# Patient Record
Sex: FEMALE | Race: BLACK OR AFRICAN AMERICAN | NOT HISPANIC OR LATINO | Employment: UNEMPLOYED | URBAN - METROPOLITAN AREA
[De-identification: names, ages, dates, MRNs, and addresses within clinical notes are randomized per-mention and may not be internally consistent; named-entity substitution may affect disease eponyms.]

---

## 2019-04-01 ENCOUNTER — HOSPITAL ENCOUNTER (EMERGENCY)
Facility: CLINIC | Age: 29
Discharge: HOME | End: 2019-04-02
Attending: EMERGENCY MEDICINE | Admitting: EMERGENCY MEDICINE

## 2019-04-01 VITALS
WEIGHT: 166.6 LBS | SYSTOLIC BLOOD PRESSURE: 143 MMHG | OXYGEN SATURATION: 98 % | DIASTOLIC BLOOD PRESSURE: 92 MMHG | HEIGHT: 67 IN | TEMPERATURE: 97.9 F | BODY MASS INDEX: 26.15 KG/M2 | RESPIRATION RATE: 18 BRPM

## 2019-04-01 DIAGNOSIS — H66.90 ACUTE OTITIS MEDIA, UNSPECIFIED OTITIS MEDIA TYPE: ICD-10-CM

## 2019-04-01 PROCEDURE — 99283 EMERGENCY DEPT VISIT LOW MDM: CPT | Performed by: EMERGENCY MEDICINE

## 2019-04-01 PROCEDURE — 99283 EMERGENCY DEPT VISIT LOW MDM: CPT | Mod: Z6 | Performed by: EMERGENCY MEDICINE

## 2019-04-01 RX ORDER — AMOXICILLIN 500 MG/1
500 CAPSULE ORAL 3 TIMES DAILY
Qty: 21 CAPSULE | Refills: 0 | Status: SHIPPED | OUTPATIENT
Start: 2019-04-01 | End: 2019-04-08

## 2019-04-01 RX ORDER — IBUPROFEN 200 MG
200 TABLET ORAL EVERY 4 HOURS PRN
COMMUNITY
End: 2024-01-02

## 2019-04-01 ASSESSMENT — MIFFLIN-ST. JEOR: SCORE: 1523.32

## 2019-04-01 ASSESSMENT — ENCOUNTER SYMPTOMS: FEVER: 0

## 2019-04-01 NOTE — ED AVS SNAPSHOT
[facility], [place], Emergency Department  [address] [address]  Phone:  [phone]                                    Maryann Mc   MRN: [id number]    Department:  [facility], [place], Emergency Department   Date of Visit:  4/1/2019           After Visit Summary Signature Page    I have received my discharge instructions, and my questions have been answered. I have discussed any challenges I see with this plan with the nurse or doctor.    ..........................................................................................................................................  Patient/Patient Representative Signature      ..........................................................................................................................................  Patient Representative Print Name and Relationship to Patient    ..................................................               ................................................  Date                                   Time    ..........................................................................................................................................  Reviewed by Signature/Title    ...................................................              ..............................................  Date                                               Time          22EPIC Rev 08/18

## 2019-04-02 NOTE — ED PROVIDER NOTES
"  History     Chief Complaint   Patient presents with     Ear Drainage     Otalgia     HPI  Maryann Mc is a 28 year old female who presents to the Emergency Department today for evaluation of right ear pain, fullness, and drainage for he past 2 days. The patient states that her pain has now worsened making it difficult to sleep. She states that pain is now radiating into the right side of her face. She denies any changes in her hearing. No fevers.     I have reviewed the Medications, Allergies, Past Medical and Surgical History, and Social History in the Epic system.    History reviewed. No pertinent past medical history.    Past Surgical History:   Procedure Laterality Date     GYN SURGERY       ORTHOPEDIC SURGERY       No family history on file.    Social History     Tobacco Use     Smoking status: Current Every Day Smoker     Smokeless tobacco: Never Used   Substance Use Topics     Alcohol use: Yes     Alcohol/week: 1.2 oz     Types: 2 Glasses of wine per week     No current facility-administered medications for this encounter.      Current Outpatient Medications   Medication     amoxicillin (AMOXIL) 500 MG capsule     ibuprofen (ADVIL/MOTRIN) 200 MG tablet      No Known Allergies     Review of Systems   Constitutional: Negative for fever.   HENT: Positive for ear discharge (right) and ear pain (right).    All other systems reviewed and are negative.    Physical Exam   BP: (!) 143/92  Heart Rate: 91  Temp: 97.9  F (36.6  C)  Resp: 18  Height: 170.2 cm (5' 7\")  Weight: 75.6 kg (166 lb 9.6 oz)  SpO2: 98 %    Physical Exam   Constitutional: No distress.   HENT:   Head: Atraumatic.   Mouth/Throat: Oropharynx is clear and moist. No oropharyngeal exudate.   Right TM erythematous on visualized section, wax obscures most of the eardrum   Eyes: EOM are normal. No scleral icterus.   Neck: Neck supple.   Cardiovascular: Normal rate, regular rhythm and normal heart sounds.   Pulmonary/Chest: Breath sounds normal. No " respiratory distress.   Abdominal: Soft. She exhibits no distension. There is no tenderness.   Musculoskeletal: She exhibits no edema or deformity.   Neurological: She is alert.   Skin: Skin is warm and dry. She is not diaphoretic.   Psychiatric: She has a normal mood and affect. Her behavior is normal.       ED Course   9:48 PM  The patient was seen and examined by Dr. Sylvester in Room 12.       Procedures           Labs Ordered and Resulted from Time of ED Arrival Up to the Time of Departure from the ED - No data to display    No results found for this or any previous visit (from the past 24 hour(s)).        Assessments & Plan (with Medical Decision Making)   28-year-old female presents to us with a chief complaint of ear pain.  Differential includes but not limited to otitis media, otitis externa.  No evidence of X Pretty on exam.  Part of her eardrum is visible and is erythematous.  Large amount of wax in the ear canals obscuring it otherwise we will treat her for otitis media as I cannot rule it out based on the exam.    I have reviewed the nursing notes.    I have reviewed the findings, diagnosis, plan and need for follow up with the patient.     Medication List      Started    amoxicillin 500 MG capsule  Commonly known as:  AMOXIL  500 mg, Oral, 3 TIMES DAILY          Final diagnoses:   Acute otitis media, unspecified otitis media type   I, Edson Swan, am serving as a trained medical scribe to document services personally performed by Giovani Sylvester DO, based on the provider's statements to me.   I, Giovani Sylvester DO, was physically present and have reviewed and verified the accuracy of this note documented by Edson Swan.    4/1/2019   [facility], EMERGENCY DEPARTMENT     Giovani Sylvester DO  04/01/19 2215

## 2019-04-02 NOTE — ED TRIAGE NOTES
Pt c/o R ear ache and drainage. States that her face and tooth is now sore. Pt reports taking 5 200mg tablets of ibuprofen. Pt educated on med administration.

## 2019-04-02 NOTE — ED NOTES
Called patient and informed her that we have her antibiotic prescription that she did not receive d/t her leaving before us discharging her with paperwor.  She stated she will be back to pick it up.

## 2019-04-02 NOTE — ED NOTES
Pt left d/t concerns of her car getting a ticket. I informed her I was printing her paperwork and it would only be a couple more minutes. Pt still insisted on leaving and said she will be back for her paperwork.

## 2024-01-02 ENCOUNTER — HOSPITAL ENCOUNTER (EMERGENCY)
Facility: CLINIC | Age: 34
Discharge: HOME | End: 2024-01-02
Admitting: PHYSICIAN ASSISTANT
Payer: COMMERCIAL

## 2024-01-02 ENCOUNTER — HOSPITAL ENCOUNTER (EMERGENCY)
Facility: CLINIC | Age: 34
End: 2024-01-02

## 2024-01-02 VITALS
HEART RATE: 91 BPM | SYSTOLIC BLOOD PRESSURE: 151 MMHG | TEMPERATURE: 98 F | RESPIRATION RATE: 19 BRPM | DIASTOLIC BLOOD PRESSURE: 95 MMHG | OXYGEN SATURATION: 97 %

## 2024-01-02 DIAGNOSIS — B34.9 VIRAL SYNDROME: ICD-10-CM

## 2024-01-02 LAB
FLUAV RNA SPEC QL NAA+PROBE: NEGATIVE
FLUBV RNA RESP QL NAA+PROBE: NEGATIVE
GROUP A STREP BY PCR: NOT DETECTED
RSV RNA SPEC NAA+PROBE: NEGATIVE
SARS-COV-2 RNA RESP QL NAA+PROBE: NEGATIVE

## 2024-01-02 PROCEDURE — 87637 SARSCOV2&INF A&B&RSV AMP PRB: CPT | Performed by: EMERGENCY MEDICINE

## 2024-01-02 PROCEDURE — 99283 EMERGENCY DEPT VISIT LOW MDM: CPT | Performed by: PHYSICIAN ASSISTANT

## 2024-01-02 PROCEDURE — 87651 STREP A DNA AMP PROBE: CPT | Performed by: EMERGENCY MEDICINE

## 2024-01-02 ASSESSMENT — ENCOUNTER SYMPTOMS: FLU SYMPTOMS: 1

## 2024-01-02 ASSESSMENT — ACTIVITIES OF DAILY LIVING (ADL): ADLS_ACUITY_SCORE: 33

## 2024-01-02 NOTE — ED TRIAGE NOTES
Triage Assessment (Adult)       Row Name 01/02/24 1547          Triage Assessment    Airway WDL WDL        Respiratory WDL    Respiratory WDL WDL        Skin Circulation/Temperature WDL    Skin Circulation/Temperature WDL WDL        Cardiac WDL    Cardiac WDL WDL        Peripheral/Neurovascular WDL    Peripheral Neurovascular WDL WDL        Cognitive/Neuro/Behavioral WDL    Cognitive/Neuro/Behavioral WDL WDL

## 2024-01-02 NOTE — ED PROVIDER NOTES
ED Provider Note  [facility]      History     Chief Complaint   Patient presents with    Flu Symptoms     Pt c/o throat pain, cough with green mucus and headache started last Friday. Pt taking tylenol but she states it is not working.          Flu Symptoms    34yo F no pmhx p/w viral illness symptoms x5 days.  Reports sore throat, subjective fever and chills, congestion, rhinorrhea, headache, productive cough.  Denies CP, SOB, abdominal pain, nausea, vomiting, diarrhea.  States that she was around her brother who recently tested COVID-positive.    Past Medical History  Past Medical History:   Diagnosis Date    Bronchitis      Past Surgical History:   Procedure Laterality Date    GYN SURGERY      ORTHOPEDIC SURGERY       No current outpatient medications on file.    No Known Allergies  Family History  History reviewed. No pertinent family history.  Social History   Social History     Tobacco Use    Smoking status: Every Day    Smokeless tobacco: Never   Substance Use Topics    Alcohol use: Yes     Alcohol/week: 2.0 standard drinks of alcohol     Types: 2 Glasses of wine per week    Drug use: No         A medically appropriate review of systems was performed with pertinent positives and negatives noted in the HPI, and all other systems negative.    Physical Exam   BP: (!) 151/95  Pulse: 91  Temp: 98  F (36.7  C)  Resp: 19  SpO2: 97 %  Physical Exam  Constitutional:       General: She is not in acute distress.     Appearance: Normal appearance. She is well-developed.   HENT:      Head: Normocephalic and atraumatic.   Eyes:      Conjunctiva/sclera: Conjunctivae normal.   Cardiovascular:      Rate and Rhythm: Normal rate and regular rhythm.   Pulmonary:      Effort: Pulmonary effort is normal.      Breath sounds: Normal breath sounds.   Musculoskeletal:      Cervical back: Normal range of motion and neck supple.   Skin:     General: Skin is warm and dry.      Findings: No rash.   Neurological:       Mental Status: She is alert and oriented to person, place, and time.           ED Course, Procedures, & Data      Procedures                      Results for orders placed or performed during the hospital encounter of 01/02/24   Symptomatic Influenza A/B, RSV, & SARS-CoV2 PCR (COVID-19) Nasopharyngeal     Status: Normal    Specimen: Nasopharyngeal; Swab   Result Value Ref Range    Influenza A PCR Negative Negative    Influenza B PCR Negative Negative    RSV PCR Negative Negative    SARS CoV2 PCR Negative Negative    Narrative    Testing was performed using the Xpert Xpress CoV2/Flu/RSV Assay on the [employer] Instrument. This test should be ordered for the detection of SARS-CoV-2, influenza, and RSV viruses in individuals who meet clinical and/or epidemiological criteria. Test performance is unknown in asymptomatic patients. This test is for in vitro diagnostic use under the FDA EUA for laboratories certified under CLIA to perform high or moderate complexity testing. This test has not been FDA cleared or approved. A negative result does not rule out the presence of PCR inhibitors in the specimen or target RNA in concentration below the limit of detection for the assay. If only one viral target is positive but coinfection with multiple targets is suspected, the sample should be re-tested with another FDA cleared, approved, or authorized test, if coinfection would change clinical management. This test was validated by the [facility] and [facility]. These laboratories are certified under the Clinical Laboratory Improvement Amendments of 1988 (CLIA-88) as qualified to perform high complexity laboratory testing.   Group A Streptococcus PCR Throat Swab     Status: Normal    Specimen: Throat; Swab   Result Value Ref Range    Group A strep by PCR Not Detected Not Detected    Narrative    The Xpert Xpress Strep A test, performed on the [employer], is a rapid, qualitative in vitro  diagnostic test for the detection of Streptococcus pyogenes (Group A ß-hemolytic Streptococcus, Strep A) in throat swab specimens from patients with signs and symptoms of pharyngitis. The Xpert Xpress Strep A test can be used as an aid in the diagnosis of Group A Streptococcal pharyngitis. The assay is not intended to monitor treatment for Group A Streptococcus infections. The Xpert Xpress Strep A test utilizes an automated real-time polymerase chain reaction (PCR) to detect Streptococcus pyogenes DNA.     Medications - No data to display  Labs Ordered and Resulted from Time of ED Arrival to Time of ED Departure   INFLUENZA A/B, RSV, & SARS-COV2 PCR - Normal       Result Value    Influenza A PCR Negative      Influenza B PCR Negative      RSV PCR Negative      SARS CoV2 PCR Negative     GROUP A STREPTOCOCCUS PCR THROAT SWAB - Normal    Group A strep by PCR Not Detected       No orders to display          Critical care was not performed.     Medical Decision Making  The patient's presentation was of moderate complexity (an acute illness with systemic symptoms).    The patient's evaluation involved:  ordering and/or review of 3+ test(s) in this encounter (see separate area of note for details)    The patient's management necessitated only low risk treatment.    Assessment & Plan    34yo F no pmhx p/w viral illness symptoms x5 days I/s/o known COVID exposure.  In ED patient is HDS/AF, normal SpO2.  Well-appearing.  Lungs clear.  COVID testnegative.  Influenza, RSV, strep negative.  Patient afebrile with normal sats in the ED, low suspicion PNA.  Symptoms consistent with viral syndrome.  Patient discharged with symptomatic care instructions, PCP follow-up, ER return precautions.    I have reviewed the nursing notes. I have reviewed the findings, diagnosis, plan and need for follow up with the patient.    New Prescriptions    No medications on file       Final diagnoses:   Viral syndrome         GARRETT Hoskins  [facility] EMERGENCY DEPARTMENT  1/2/2024     Juvencio Campbell PA-C  01/02/24 1736

## 2024-01-02 NOTE — ED TRIAGE NOTES
Pt c/o throat pain, cough with green mucus and headache started last Friday. Pt taking tylenol but she states it is not working.

## 2024-01-02 NOTE — Clinical Note
Maryann Mc was seen and treated in our emergency department on 1/2/2024.         Sincerely,     [facility] Emergency Department

## 2024-04-15 ENCOUNTER — HOSPITAL ENCOUNTER (EMERGENCY)
Facility: CLINIC | Age: 34
Discharge: HOME | End: 2024-04-16
Attending: EMERGENCY MEDICINE | Admitting: EMERGENCY MEDICINE
Payer: COMMERCIAL

## 2024-04-15 DIAGNOSIS — R09.81 NASAL CONGESTION: ICD-10-CM

## 2024-04-15 PROCEDURE — 99283 EMERGENCY DEPT VISIT LOW MDM: CPT | Performed by: EMERGENCY MEDICINE

## 2024-04-15 PROCEDURE — 99284 EMERGENCY DEPT VISIT MOD MDM: CPT | Performed by: EMERGENCY MEDICINE

## 2024-04-15 ASSESSMENT — COLUMBIA-SUICIDE SEVERITY RATING SCALE - C-SSRS
6. HAVE YOU EVER DONE ANYTHING, STARTED TO DO ANYTHING, OR PREPARED TO DO ANYTHING TO END YOUR LIFE?: NO
2. HAVE YOU ACTUALLY HAD ANY THOUGHTS OF KILLING YOURSELF IN THE PAST MONTH?: NO
1. IN THE PAST MONTH, HAVE YOU WISHED YOU WERE DEAD OR WISHED YOU COULD GO TO SLEEP AND NOT WAKE UP?: NO

## 2024-04-16 VITALS
HEART RATE: 75 BPM | RESPIRATION RATE: 16 BRPM | OXYGEN SATURATION: 99 % | SYSTOLIC BLOOD PRESSURE: 129 MMHG | DIASTOLIC BLOOD PRESSURE: 77 MMHG | TEMPERATURE: 97.7 F

## 2024-04-16 PROCEDURE — 250N000009 HC RX 250: Performed by: EMERGENCY MEDICINE

## 2024-04-16 PROCEDURE — 250N000013 HC RX MED GY IP 250 OP 250 PS 637: Performed by: EMERGENCY MEDICINE

## 2024-04-16 RX ORDER — OXYMETAZOLINE HYDROCHLORIDE 0.05 G/100ML
2 SPRAY NASAL ONCE
Status: COMPLETED | OUTPATIENT
Start: 2024-04-16 | End: 2024-04-16

## 2024-04-16 RX ORDER — TRIAMCINOLONE ACETONIDE 0.25 MG/G
OINTMENT TOPICAL 2 TIMES DAILY
Qty: 15 G | Refills: 0 | Status: SHIPPED | OUTPATIENT
Start: 2024-04-16

## 2024-04-16 RX ORDER — TRIAMCINOLONE ACETONIDE 1 MG/G
CREAM TOPICAL
Qty: 15 G | Refills: 0 | Status: SHIPPED | OUTPATIENT
Start: 2024-04-16 | End: 2024-04-16 | Stop reason: ALTCHOICE

## 2024-04-16 RX ORDER — FLUTICASONE PROPIONATE 50 MCG
1 SPRAY, SUSPENSION (ML) NASAL DAILY
Status: DISCONTINUED | OUTPATIENT
Start: 2024-04-16 | End: 2024-04-16 | Stop reason: HOSPADM

## 2024-04-16 RX ADMIN — FLUTICASONE PROPIONATE 1 SPRAY: 50 SPRAY, METERED NASAL at 02:07

## 2024-04-16 RX ADMIN — OXYMETAZOLINE HYDROCHLORIDE 2 SPRAY: 0.05 SPRAY NASAL at 01:58

## 2024-04-16 ASSESSMENT — ACTIVITIES OF DAILY LIVING (ADL)
ADLS_ACUITY_SCORE: 35
ADLS_ACUITY_SCORE: 33

## 2024-04-16 NOTE — ED PROVIDER NOTES
[facility] EMERGENCY DEPARTMENT ([facility])    4/15/24      ED PROVIDER NOTE    History     Chief Complaint   Patient presents with    Cold Symptoms     Pt states she thinks she is having an allergy flare-ups, sneezing a lot and congested for about two days. Has taken claritin and tylenol with no relief. Also endorses having eye puffiness and headache earlier.    Rash     Also stating she is having a breakout on her face, states she thinks it is her eczema but has had no relief with creams at home.      HPI  Maryann Mc is a 33 year old female with history of eczema who presents to the ED with cold symptoms and rash. She reports frequent sneezing and congestion for the past day. She is concerned of allergy flare-up. She took Claritin today without improvement. She also reports a rash on her face that she believes is eczema. She states her brother has triamcinolone cream for his eczema that has helped her in the past. She also reports headache earlier in the day that resolved with Tylenol.    Past Medical History  Past Medical History:   Diagnosis Date    Bronchitis      Past Surgical History:   Procedure Laterality Date    GYN SURGERY      ORTHOPEDIC SURGERY       triamcinolone (KENALOG) 0.025 % external ointment      No Known Allergies  Family History  No family history on file.  Social History   Social History     Tobacco Use    Smoking status: Every Day    Smokeless tobacco: Never   Substance Use Topics    Alcohol use: Yes     Alcohol/week: 2.0 standard drinks of alcohol     Types: 2 Glasses of wine per week    Drug use: No      Past medical history, past surgical history, medications, allergies, family history, and social history were reviewed with the patient. No additional pertinent items.      A complete review of systems was performed with pertinent positives and negatives noted in the HPI, and all other systems negative.    Physical Exam   BP: (!) 133/92  Pulse: 93  Temp: 98.9  F (37.2  C)  Resp:  16  SpO2: 97 %  Physical Exam  General: Afebrile, no acute distress   HEENT: Normocephalic, atraumatic, conjunctivae normal. + nasal congestion, MMM  Neck: non-tender, supple  Cardio: regular rate. regular rhythm   Resp: Normal work of breathing, no respiratory distress, lungs clear bilaterally, no wheezing, rhonchi, rales  Chest/Back: no visual signs of trauma, no CVA tenderness   Abdomen: soft, non distension, no tenderness, no peritoneal signs   Neuro: alert and fully oriented. CN II-XII grossly intact. Grossly normal strength and sensation in all extremities.   MSK: no deformities. Normal range of motion  Integumentary/Skin: no rash visualized, normal color  Psych: normal affect, normal behavior    ED Course, Procedures, & Data      Procedures    No results found for any visits on 04/15/24.  Medications   fluticasone (FLONASE) 50 MCG/ACT spray 1 spray (has no administration in time range)   oxymetazoline (AFRIN) 0.05 % spray 2 spray (2 sprays Both Nostrils $Given 4/16/24 0158)     Labs Ordered and Resulted from Time of ED Arrival to Time of ED Departure - No data to display  No orders to display          Critical care was not performed.     Medical Decision Making  The patient's presentation was of low complexity (2+ clearly self-limited or minor problems).    The patient's evaluation involved:  history and exam without other MDM data elements    The patient's management necessitated moderate risk (prescription drug management including medications given in the ED).    Assessment & Plan    Maryann Mc is a 33 year old female with history of eczema who presents to the ED with cold symptoms, nasal congestion and sneezing x 1 day as well as flareup of her eczema.  On arrival patient is nontoxic-appearing, afebrile, no distress.  Patient hemodynamically stable vital signs within normal limits.  Patient with nasal congestion on examination.  Patient was treated with Afrin in the emergency department.  Will plan on  discharge home with Flonase, over-the-counter decongestions, triamcinolone ointment as this has worked for patient's eczema symptoms in the past as well as continue supportive care and close outpatient follow-up.  Return precautions discussed.  Patient understands and agrees with the plan.    I have reviewed the nursing notes. I have reviewed the findings, diagnosis, plan and need for follow up with the patient.    New Prescriptions    TRIAMCINOLONE (KENALOG) 0.025 % EXTERNAL OINTMENT    Apply topically 2 times daily       Final diagnoses:   Nasal congestion       Shannon Pettit MD  [facility] EMERGENCY DEPARTMENT  4/15/2024     Shannon Pettit MD  04/16/24 0200

## 2024-04-16 NOTE — DISCHARGE INSTRUCTIONS
Please follow-up with your primary care provider in the next 2 to 3 days if no improvement of your symptoms.  Please call to schedule an appointment.    Please continue an over-the-counter allergy medication daily (every morning).  Please also use the nasal spray 1 to 2 sprays in both nostrils daily for the next 7 to 10 days.    Please take Tylenol and ibuprofen as needed for fever, pain.  You may also use an over-the-counter decongestion to help with your symptoms (ex: sudafed, mucinex, etc).    Please Return to the emergency department if you develop high fever, difficulty breathing, new or worsening symptoms.  It was a pleasure taking care of you today.  We hope you feel better soon.

## 2024-04-16 NOTE — ED TRIAGE NOTES
Triage Assessment (Adult)       Row Name 04/15/24 4893          Triage Assessment    Airway WDL WDL        Respiratory WDL    Respiratory WDL WDL        Skin Circulation/Temperature WDL    Skin Circulation/Temperature WDL WDL        Cardiac WDL    Cardiac WDL WDL        Peripheral/Neurovascular WDL    Peripheral Neurovascular WDL WDL        Cognitive/Neuro/Behavioral WDL    Cognitive/Neuro/Behavioral WDL WDL

## 2025-07-29 ENCOUNTER — HOSPITAL ENCOUNTER (EMERGENCY)
Facility: CLINIC | Age: 35
Discharge: HOME | End: 2025-07-29
Payer: COMMERCIAL

## 2025-07-29 VITALS
DIASTOLIC BLOOD PRESSURE: 89 MMHG | OXYGEN SATURATION: 100 % | HEIGHT: 67 IN | HEART RATE: 96 BPM | WEIGHT: 170 LBS | BODY MASS INDEX: 26.68 KG/M2 | TEMPERATURE: 98.5 F | RESPIRATION RATE: 16 BRPM | SYSTOLIC BLOOD PRESSURE: 159 MMHG

## 2025-07-29 DIAGNOSIS — S61.211A LACERATION OF LEFT INDEX FINGER WITHOUT FOREIGN BODY WITHOUT DAMAGE TO NAIL, INITIAL ENCOUNTER: Primary | ICD-10-CM

## 2025-07-29 PROCEDURE — 99284 EMERGENCY DEPT VISIT MOD MDM: CPT | Performed by: STUDENT IN AN ORGANIZED HEALTH CARE EDUCATION/TRAINING PROGRAM

## 2025-07-29 PROCEDURE — 99283 EMERGENCY DEPT VISIT LOW MDM: CPT

## 2025-07-29 PROCEDURE — 250N000013 HC RX MED GY IP 250 OP 250 PS 637: Performed by: STUDENT IN AN ORGANIZED HEALTH CARE EDUCATION/TRAINING PROGRAM

## 2025-07-29 RX ORDER — ACETAMINOPHEN 500 MG
1000 TABLET ORAL ONCE
Status: COMPLETED | OUTPATIENT
Start: 2025-07-29 | End: 2025-07-29

## 2025-07-29 RX ORDER — CEPHALEXIN 500 MG/1
500 CAPSULE ORAL 2 TIMES DAILY
Qty: 10 CAPSULE | Refills: 0 | Status: SHIPPED | OUTPATIENT
Start: 2025-07-29 | End: 2025-08-03

## 2025-07-29 RX ORDER — CEPHALEXIN 500 MG/1
500 CAPSULE ORAL ONCE
Status: COMPLETED | OUTPATIENT
Start: 2025-07-29 | End: 2025-07-29

## 2025-07-29 RX ADMIN — ACETAMINOPHEN 1000 MG: 500 TABLET ORAL at 22:15

## 2025-07-29 RX ADMIN — CEPHALEXIN 500 MG: 500 CAPSULE ORAL at 22:12

## 2025-07-29 ASSESSMENT — COLUMBIA-SUICIDE SEVERITY RATING SCALE - C-SSRS
1. IN THE PAST MONTH, HAVE YOU WISHED YOU WERE DEAD OR WISHED YOU COULD GO TO SLEEP AND NOT WAKE UP?: NO
2. HAVE YOU ACTUALLY HAD ANY THOUGHTS OF KILLING YOURSELF IN THE PAST MONTH?: NO
6. HAVE YOU EVER DONE ANYTHING, STARTED TO DO ANYTHING, OR PREPARED TO DO ANYTHING TO END YOUR LIFE?: NO

## 2025-07-29 ASSESSMENT — ACTIVITIES OF DAILY LIVING (ADL): ADLS_ACUITY_SCORE: 41

## 2025-07-30 NOTE — ED NOTES
Pts lac dressed prior to discharge. Pt given discharge paperwork and printed prescription, no questions for writer.

## 2025-07-30 NOTE — DISCHARGE INSTRUCTIONS
You were seen in the emergency room today for evaluation of a cut to your left index finger.  The area was cleaned, covered with an antibiotic ointment, and wrapped.  I gave you a dose of an oral antibiotic to help prevent any infection.  I sent a prescription for the antibiotic to continue taking twice a day for the next 5 days.  If you have any new or worsening symptoms, please return to the emergency department, otherwise follow-up with your primary care provider.

## 2025-07-30 NOTE — ED PROVIDER NOTES
ED Provider Note  [facility]      History     Chief Complaint   Patient presents with    Hand Pain     Pt reports was cutting onion and accidentally  cut left index finger last night around 2200. No bleeding. Just wants to get it check.      The history is provided by the patient and medical records. No  was used.     Maryann Mc is an otherwise healthy, right-hand-dominant 34 year old female who presents to the emergency department for evaluation of a finger laceration. She reports about 24 hours ago, she was using a kitchen knife to cut an onion when she accidentally cut the palmar aspect of her left index finger. She rinsed the area immediately after the injury and bleeding was well-controlled. She presents to the ED today for evaluation, just wants to ensure there is nothing wrong. She is able to move the finger although it is uncomfortable and she has noticed some redness and swelling around the laceration. She denies any fevers, chills, nausea, vomiting, or other concerning symptoms. Per [facility], last tetanus was administered 6/12/2025.    Past Medical History  Past Medical History:   Diagnosis Date    Bronchitis      Past Surgical History:   Procedure Laterality Date    GYN SURGERY      ORTHOPEDIC SURGERY       cephALEXin (KEFLEX) 500 MG capsule  triamcinolone (KENALOG) 0.025 % external ointment      No Known Allergies  Family History  No family history on file.  Social History   Social History     Tobacco Use    Smoking status: Every Day    Smokeless tobacco: Never   Substance Use Topics    Alcohol use: Yes     Alcohol/week: 2.0 standard drinks of alcohol     Types: 2 Glasses of wine per week    Drug use: No      A medically appropriate review of systems was performed with pertinent positives and negatives noted in the HPI, and all other systems negative.    Physical Exam   BP: (!) 159/89  Pulse: 96  Temp: 98.5  F (36.9  C)  Resp: 16  Height: 170.2 cm (5'  "7\")  Weight: 77.1 kg (170 lb)  SpO2: 100 %  Physical Exam  Constitutional:       General: She is not in acute distress.     Appearance: Normal appearance. She is not ill-appearing, toxic-appearing or diaphoretic.   HENT:      Head: Atraumatic.      Mouth/Throat:      Mouth: Mucous membranes are moist.   Eyes:      General: No scleral icterus.     Conjunctiva/sclera: Conjunctivae normal.   Cardiovascular:      Rate and Rhythm: Normal rate.      Heart sounds: Normal heart sounds.   Pulmonary:      Effort: Pulmonary effort is normal. No respiratory distress.   Musculoskeletal:      Cervical back: Neck supple.   Skin:     General: Skin is warm.      Capillary Refill: Capillary refill takes less than 2 seconds.      Findings: No rash.      Comments: 1 cm superficial laceration to palmar aspect of PIP joint of left index finger, bleeding controlled, able to flex and extend finger against resistance at PIP and DIP joints, mild discomfort at site of laceration with passive extension, some surrounding erythema and edema but no fusiform swelling, no tenderness along palpation of flexor tendon, distal sensation and cap refill intact   Neurological:      General: No focal deficit present.      Mental Status: She is alert and oriented to person, place, and time.      Sensory: No sensory deficit.      Motor: No weakness.      Gait: Gait normal.           ED Course, Procedures, & Data      Procedures             Medications   cephALEXin (KEFLEX) capsule 500 mg (500 mg Oral $Given 7/29/25 2212)   acetaminophen (TYLENOL) tablet 1,000 mg (1,000 mg Oral $Given 7/29/25 2215)          Critical care was not performed.     Medical Decision Making  The patient's presentation was of moderate complexity (an undiagnosed new problem with uncertain prognosis).    The patient's evaluation involved:  review of external note(s) from 1 sources ([facility])    The patient's management necessitated moderate risk (prescription drug management including " medications given in the ED) and moderate risk (a decision regarding minor procedure (laceration repair) with risk factors of delayed closure).    Assessment & Plan    Maryann Mc is an otherwise healthy, right-hand-dominant 34 year old female who presents to the emergency department for evaluation of a finger laceration. About 24 hours prior to presentation she was using a clean kitchen knife to cut an onion when she actually cut the palmar aspect of the PIP joint on her left index finger, presenting today with some swelling, redness and pain at the laceration site. On evaluation, she is hypertensive to 159/89, vitals are otherwise in normal ranges. On full assessment of the finger, considered possibility for flexor tenosynovitis, although low suspicion of this at present given no tenderness over flexor tendon sheath, ability to flex and extend with only mild discomfort at the laceration site, finger not held in flexion, only localized edema. Exam today is otherwise benign. Discussed options for wound management with patient, do not recommend closing wound at this time given that it has been 24 hours since initial injury which increases risk of infection. Will provide with antibiotic prophylaxis due to patient's significant concern. Advised to keep the area clean and dry while it heals, follow-up with primary care as needed, ED return precautions discussed. Patient and partner were in understanding and agreement with plan and had no additional questions at this time. She was discharged home in stable condition.    I have reviewed the nursing notes. I have reviewed the findings, diagnosis, plan and need for follow up with the patient.    Discharge Medication List as of 7/29/2025 10:15 PM        START taking these medications    Details   cephALEXin (KEFLEX) 500 MG capsule Take 1 capsule (500 mg) by mouth 2 times daily for 5 days., Disp-10 capsule, R-0, Local Print             Final diagnoses:   Laceration of left  index finger without foreign body without damage to nail, initial encounter       Yesenia Wyman PA-C   [facility] EMERGENCY DEPARTMENT  7/29/2025     Yesenia Wyman PA-C  07/31/25 1517